# Patient Record
Sex: MALE | Race: BLACK OR AFRICAN AMERICAN | NOT HISPANIC OR LATINO | URBAN - METROPOLITAN AREA
[De-identification: names, ages, dates, MRNs, and addresses within clinical notes are randomized per-mention and may not be internally consistent; named-entity substitution may affect disease eponyms.]

---

## 2020-03-19 ENCOUNTER — TELEPHONE (OUTPATIENT)
Dept: NEUROSURGERY | Facility: CLINIC | Age: 60
End: 2020-03-19

## 2020-03-19 NOTE — TELEPHONE ENCOUNTER
Tried reaching out to patient regarding rescheduling 3/26/20 appointment to a virtual visit. Was unable to leave a voice mail message for patient mail box full. Text message sent to patient to sign up for [facility] with a activation code.

## 2020-03-20 ENCOUNTER — TELEPHONE (OUTPATIENT)
Dept: NEUROSURGERY | Facility: CLINIC | Age: 60
End: 2020-03-20

## 2020-03-20 ENCOUNTER — PATIENT MESSAGE (OUTPATIENT)
Dept: NEUROSURGERY | Facility: CLINIC | Age: 60
End: 2020-03-20

## 2020-03-20 NOTE — TELEPHONE ENCOUNTER
Spoke with patient regarding changing to appointment on 3/26/20. I was able to set patient up for [facility]. Patient appointment changed to virtual visit and also message sent to patient  In [facility]

## 2020-03-20 NOTE — TELEPHONE ENCOUNTER
Also inform patient that imaging would have to be sent to the office for review before appointment. Address to [facility] given. Patient states he would contact the place in [place] where his imaging were done and have them mailed to [facility], patient also verbalized understanding

## 2020-03-23 ENCOUNTER — TELEPHONE (OUTPATIENT)
Dept: NEUROSURGERY | Facility: CLINIC | Age: 60
End: 2020-03-23

## 2020-03-23 NOTE — TELEPHONE ENCOUNTER
Spoke with patient regarding imaging being sent to office for review before virtual visit with Dr. Zimmer. Patient states he reached out to provider office and have to sign a fax giving them permission to release medical information. Inform patient once release is sign to give us a call for instructions on sending imaging. Patient voiced understanding.

## 2020-04-02 ENCOUNTER — OFFICE VISIT (OUTPATIENT)
Dept: NEUROSURGERY | Facility: CLINIC | Age: 60
End: 2020-04-02
Payer: COMMERCIAL

## 2020-04-02 ENCOUNTER — TELEPHONE (OUTPATIENT)
Dept: NEUROSURGERY | Facility: CLINIC | Age: 60
End: 2020-04-02

## 2020-04-02 DIAGNOSIS — G56.21 ULNAR NEUROPATHY AT ELBOW, RIGHT: Primary | ICD-10-CM

## 2020-04-02 PROCEDURE — 99203 PR OFFICE/OUTPT VISIT, NEW, LEVL III, 30-44 MIN: ICD-10-PCS | Mod: 95,,, | Performed by: NEUROLOGICAL SURGERY

## 2020-04-02 PROCEDURE — 99203 OFFICE O/P NEW LOW 30 MIN: CPT | Mod: 95,,, | Performed by: NEUROLOGICAL SURGERY

## 2020-04-02 NOTE — PROGRESS NOTES
Dear Dr. Medellin,     Thank you for referring this patient to my clinic. The full details of my evaluation will also be forthcoming to you by letter.    CHIEF COMPLAINT:  Consult for second opinion    The patient location is: Patient Home  The chief complaint leading to consultation is: Second opinion  Visit type: Virtual visit with synchronous audio and video  Total time spent with patient: 11 minutes  Each patient to whom he or she provides medical services by telemedicine is:  (1) informed of the relationship between the physician and patient and the respective role of any other health care provider with respect to management of the patient; and (2) notified that he or she may decline to receive medical services by telemedicine and may withdraw from such care at any time.      I, Jeevan Waterman, attest that this documentation has been prepared under the direction and in the presence of Vin Zimmer MD.    HPI:  Kwabena Cevallos is a 60 y.o.  male, who is referred to me by Dr. Medellin for evaluation of pseudoarthrosis and RUE pain. Pt reports right hand numbness worst in the last 3 digits. He also notes weakness in his fingers stating that he is having difficulty holding objects. He will drop his toothbrush while brushing his teeth.  Pt has received 2 previous neck surgeries in 2014 and 2017 for numbness and tingling radiating down his upper extremities. Most of his symptoms improved but his hand symptoms have persisted. Pt has noticed muscle atrophy in his right hand intrinsics. Pt received elbow surgery in 2/11/2020. Pt has been told that his symptoms could be stemming from his neck or his ulnar nerve. He states that since his right elbow surgery the right hand numbness has been affecting a smaller area. He notes numbness in the ulnar aspect of his 4th finger but not on the radial side. Pt had an EMG completed around a year ago. Pt works at a [employer]. Pt has been using hand exercise devices to strengthen  his right hand.       Review of patient's allergies indicates:  Allergies not on file    No past medical history on file.  No past surgical history on file.  No family history on file.  Social History     Tobacco Use    Smoking status: Not on file   Substance Use Topics    Alcohol use: Not on file    Drug use: Not on file        Review of Systems   Constitutional: Negative.    HENT: Negative.    Eyes: Negative.    Respiratory: Negative.    Cardiovascular: Negative.    Gastrointestinal: Negative.    Endocrine: Negative.    Genitourinary: Negative.    Musculoskeletal: Negative for back pain, gait problem and neck pain.   Skin: Negative.    Allergic/Immunologic: Negative.    Neurological: Negative for weakness, light-headedness, numbness and headaches.   Hematological: Negative.    Psychiatric/Behavioral: Negative.        OBJECTIVE:   Vital Signs:       Physical Exam:    Vital signs: All nursing notes and vital signs reviewed -- afebrile, vital signs stable.  Constitutional: Patient sitting comfortably in chair. Appears well developed and well nourished.  Skin: Exposed areas are intact without abnormal markings, rashes or other lesions.  HEENT: Normocephalic. Normal conjunctivae.  Cardiovascular: Normal rate and regular rhythm.  Respiratory: Chest wall rises and falls symmetrically, without signs of respiratory distress.  Abdomen: Soft and non-tender.  Extremities: Warm and without edema. Calves supple, non-tender.  Psych/Behavior: Normal affect.    Neurological:    Mental status: Alert and oriented. Conversational and appropriate.       Cranial Nerves:  Grossly intact.       Diagnostic Results:  All imaging was independently reviewed by me.    MRI C-spine, dated 10/2/2019:  1. Moderate right neuroforaminal stenosis at C6/7    Flex/Ex X-ray C-spine, dated 10/2/2019:  1. ACDF from C3 to C6  2. Probable fusion C3 to C5  3. No obvious instability    ASSESSMENT/PLAN:     Asa Mart has signs and symptoms of right  ulnar neuropathy status post ulnar nerve neurolysis.  His symptoms are improving since the surgery.  He comes to me today because he has recently had a surgical recommendation for exploration of his cervical spine with concern for C8 radiculopathy.  Because I think this is classic ulnar neuropathy which is improving with neurolysis and because I do not see significant neural foraminal stenosis at C7-T1 I have recommended occupational therapy and more time for recovery (now PO month 2).  He will follow-up with me as needed.    The patient understands and agrees with the plan of care. All questions were answered.     1. RTC PRN      I, Dr. Vin Zimmer personally performed the services described in this documentation. All medical record entries made by the scribe, Jeevan Waterman, were at my direction and in my presence.  I have reviewed the chart and agree that the record reflects my personal performance and is accurate and complete.      Vin Zimmer M.D.  Department of Neurosurgery  [facility]      .

## 2020-04-06 ENCOUNTER — TELEPHONE (OUTPATIENT)
Dept: NEUROSURGERY | Facility: CLINIC | Age: 60
End: 2020-04-06

## 2020-04-06 DIAGNOSIS — G56.21 ULNAR NEUROPATHY AT ELBOW, RIGHT: Primary | ICD-10-CM

## 2020-04-06 NOTE — TELEPHONE ENCOUNTER
Faxed demo, PT order, and clinic note to [facility] ([phone] P, [phone] F) per pt's request.  V/U. ----- Message from Luna Colon MA sent at 4/6/2020  9:14 AM CDT -----      ----- Message -----  From: Altagracia Garay  Sent: 4/6/2020   8:04 AM CDT  To: Goran Banuelos Staff    Please call pt he need order to have therapy near home In [place]        Thanks

## 2020-05-04 ENCOUNTER — TELEPHONE (OUTPATIENT)
Dept: NEUROSURGERY | Facility: CLINIC | Age: 60
End: 2020-05-04

## 2020-05-04 NOTE — TELEPHONE ENCOUNTER
----- Message from Alexa Monterroso sent at 5/4/2020  9:20 AM CDT -----  Contact: Deanne with [facility] in [place] @[phone]  Caller states she is waiting on a signature from Dr. Gallagher on a plan of care for pt. Please follow up

## 2020-05-04 NOTE — TELEPHONE ENCOUNTER
Spoke with Deanne asked her to refax the plan of care over for patient inform her that Fax was not working on last week.  Deanne faxing over [facility] for plan of care for PT. Fax number given [phone]

## 2020-05-05 ENCOUNTER — TELEPHONE (OUTPATIENT)
Dept: NEUROSURGERY | Facility: CLINIC | Age: 60
End: 2020-05-05

## 2020-05-05 NOTE — TELEPHONE ENCOUNTER
Faxed signed paperwork to [facility].----- Message from Margarita Young sent at 5/5/2020  1:26 PM CDT -----  Contact: Deanne with [facility]#[phone]  She's calling for status on plan of care. Please call